# Patient Record
Sex: MALE
[De-identification: names, ages, dates, MRNs, and addresses within clinical notes are randomized per-mention and may not be internally consistent; named-entity substitution may affect disease eponyms.]

---

## 2017-12-12 ENCOUNTER — HOSPITAL ENCOUNTER (EMERGENCY)
Dept: HOSPITAL 14 - H.ER | Age: 32
Discharge: HOME | End: 2017-12-12
Payer: COMMERCIAL

## 2017-12-12 VITALS — SYSTOLIC BLOOD PRESSURE: 120 MMHG | DIASTOLIC BLOOD PRESSURE: 70 MMHG | OXYGEN SATURATION: 98 % | TEMPERATURE: 98 F

## 2017-12-12 VITALS — BODY MASS INDEX: 36 KG/M2

## 2017-12-12 VITALS — RESPIRATION RATE: 20 BRPM | HEART RATE: 99 BPM

## 2017-12-12 DIAGNOSIS — S05.02XA: Primary | ICD-10-CM
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
## 2017-12-12 NOTE — ED PDOC
HPI: Eye Injury/Pain


Time Seen by Provider: 12/12/17 10:04


Chief Complaint (Nursing): Eye Problem


Chief Complaint (Provider): Eye Pain


History Per: Patient


History/Exam Limitations: no limitations


Onset/Duration Of Symptoms: Days (today)


Current Symptoms Are (Timing): Still Present


Additional Complaint(s): 





Pt. with pain to the left eye this morning.  Pt. denies any injury.  No vision 

changes.  No headaches, numbness, tingles, weakness.  No neck pain.  Had a 

corneal abrasion last time and feels the same.  No cough.  Able to move eyes 

with no issues.  





Past Medical History


Reviewed: Nursing Documentation, Vital Signs


Vital Signs: 


 Last Vital Signs











Temp  98.8 F   12/12/17 09:36


 


Pulse  99 H  12/12/17 09:36


 


Resp  20   12/12/17 09:36


 


BP  135/76   12/12/17 09:36


 


Pulse Ox  96   12/12/17 09:36














- Medical History


PMH: Seizures


Other PMH: corneal abrasion





- Family History


Family History: States: Unknown Family Hx





- Social History


Current smoker - smoking cessation education provided: No


Alcohol: None


Drugs: Denies





- Immunization History


Hx Tetanus Toxoid Vaccination: No


Hx Influenza Vaccination: No


Hx Pneumococcal Vaccination: No





- Home Medications


Home Medications: 


 Ambulatory Orders











 Medication  Instructions  Recorded


 


Amoxicillin 500 mg PO Q12 #20 cap 08/17/15


 


Levetiracetam [Keppra] 750 mg PO BID 08/17/15


 


Tobramycin [Tobrex] 5 ml TOP QID #1 bottle 10/05/16














- Allergies


Allergies/Adverse Reactions: 


 Allergies











Allergy/AdvReac Type Severity Reaction Status Date / Time


 


FISH Allergy  RASH Verified 12/12/17 09:51


 


shellfish derived Allergy  RASH Verified 12/12/17 09:51














Review of Systems


Constitutional: Negative for: Fever, Weakness


Eyes: Positive for: Pain.  Negative for: Vision Change, Conjunctivae 

Inflammation, Eyelid Inflammation


ENT: Negative for: Ear Pain, Ear Discharge, Nose Pain, Nose Congestion, Mouth 

Pain


Cardiovascular: Negative for: Chest Pain


Respiratory: Negative for: Cough, Shortness of Breath


Gastrointestinal: Negative for: Nausea, Vomiting, Abdominal Pain


Musculoskeletal: Negative for: Neck Pain


Skin: Negative for: Rash


Neurological: Negative for: Weakness





Physical Exam





- Reviewed


Nursing Documentation Reviewed: Yes


Vital Signs Reviewed: Yes





- Physical Exam


Appears: Positive for: Non-toxic, No Acute Distress


Head Exam: Positive for: ATRAUMATIC, NORMAL INSPECTION, NORMOCEPHALIC


Eye Exam: Positive for: EOMI, PERRL, Other (L eye with no foreign body under 

eyelids; L cornea with patch of fluorescein uptake at 6 o' clock; pain pain 

gone with numbing agent).  Negative for: Nystagmus, Periorbital swelling, 

Periorbital tenderness


ENT: Positive for: Normal ENT Inspection.  Negative for: Nasal Congestion, 

Pharyngeal Erythema, Tonsillar Exudate


Neck: Positive for: Normal, Supple


Cardiovascular/Chest: Positive for: Regular Rate, Rhythm


Respiratory: Positive for: Normal Breath Sounds


Neurologic/Psych: Positive for: Alert, Oriented.  Negative for: Motor/Sensory 

Deficits





- ECG


O2 Sat by Pulse Oximetry: 96


Pulse Ox Interpretation: Normal





- Progress


ED Course And Treament: 





1023:  Stable.  AAOx3.  Pain free.  Spoke with Dr. Garcia.  Will see pt. in 

office now.  He will rx prescriptions as needed after evaluating pt.  





Disposition





- Clinical Impression


Clinical Impression: 


 Corneal abrasion








- Patient ED Disposition


Is Patient to be Admitted: No


Counseled Patient/Family Regarding: Diagnosis, Need For Followup





- Disposition


Referrals: 


[facility] [Outside] - 12/13/17


Prakash Garcia MD [Staff Provider] - 


Disposition: Routine/Home


Disposition Time: 10:25


Condition: STABLE


Additional Instructions: 


Go to Dr. Garcia's office without fail straight from here.  You have to get 

further evaluation of your eye and he will give you prescriptions as needed.  


Return if not better in 3 days. 


Instructions:  Corneal Abrasion  (ED)

## 2018-12-18 ENCOUNTER — HOSPITAL ENCOUNTER (EMERGENCY)
Dept: HOSPITAL 31 - C.ER | Age: 33
LOS: 1 days | Discharge: HOME | End: 2018-12-19
Payer: MEDICAID

## 2018-12-18 VITALS — BODY MASS INDEX: 36 KG/M2

## 2018-12-18 DIAGNOSIS — Z76.0: Primary | ICD-10-CM

## 2018-12-18 DIAGNOSIS — G40.909: ICD-10-CM

## 2018-12-19 VITALS
HEART RATE: 107 BPM | TEMPERATURE: 97.8 F | RESPIRATION RATE: 20 BRPM | SYSTOLIC BLOOD PRESSURE: 124 MMHG | OXYGEN SATURATION: 95 % | DIASTOLIC BLOOD PRESSURE: 84 MMHG

## 2018-12-19 NOTE — C.PDOC
History Of Present Illness


33 year old male presents to the ER requesting a dose of his keppra. Patient 

states he took his dose this morning but could not find it this evening. Denies 

any complaints at this time.


Time Seen by Provider: 12/18/18 23:57


Chief Complaint (Nursing): Med Refill


History Per: Patient


History/Exam Limitations: no limitations


Onset/Duration Of Symptoms: Hrs


Current Symptoms Are (Timing): Still Present


Recent travel outside of the [place]: No





Past Medical History


Reviewed: Historical Data, Nursing Documentation, Vital Signs


Vital Signs: 





                                Last Vital Signs











Temp  97.8 F   12/18/18 23:54


 


Pulse  107 H  12/18/18 23:54


 


Resp  20   12/18/18 23:54


 


BP  124/84   12/18/18 23:54


 


Pulse Ox  95   12/18/18 23:54














- Medical History


PMH: Seizures


Family History: States: Unknown Family Hx





- Social History


Hx Alcohol Use: Yes


Hx Substance Use: No





- Immunization History


Hx Tetanus Toxoid Vaccination: Yes


Hx Influenza Vaccination: No


Hx Pneumococcal Vaccination: No





Review Of Systems


Constitutional: Negative for: Fever, Chills


Cardiovascular: Negative for: Chest Pain, Palpitations


Respiratory: Negative for: Cough, Shortness of Breath


Gastrointestinal: Negative for: Nausea, Vomiting


Neurological: Negative for: Weakness, Numbness





Physical Exam





- Physical Exam


Appears: Non-toxic


Skin: Normal Color, Warm, Dry


Head: Atraumatic, Normacephalic


Oral Mucosa: Moist


Chest: Symmetrical, No Tenderness


Cardiovascular: Rhythm Regular


Respiratory: Normal Breath Sounds, No Rales, No Rhonchi, No Wheezing


Gastrointestinal/Abdominal: Soft, No Tenderness


Neurological/Psych: Oriented x3, Normal Speech





ED Course And Treatment


O2 Sat by Pulse Oximetry: 95 (Room air)


Pulse Ox Interpretation: Normal





Medical Decision Making


Medical Decision Making: 


Keppra administered. Patient is resting comfortably in the ER in no acute 

distress, vitals are stable, will discharge home with instructions to follow up 

with PMD.





Disposition


Counseled Patient/Family Regarding: Diagnosis, Need For Followup





- Disposition


Referrals: 


Lisa Gupta MD [Staff Provider] - 


Disposition: HOME/ ROUTINE


Disposition Time: 00:07


Condition: GOOD


Forms:  [facility] (English), General Discharge Instructions





- POA


Present On Arrival: None





- Clinical Impression


Clinical Impression: 


 Medicine refill








- PA / NP / Resident Statement


MD/ has reviewed & agrees with the documentation as recorded.





- Scribe Statement


The provider has reviewed the documentation as recorded by the Scribe





Dale Merritt





All medical record entries made by the Humberto were at my direction and 

personally dictated by me. I have reviewed the chart and agree that the record 

accurately reflects my personal performance of the history, physical exam, 

medical decision making, and the department course for this patient. I have also

personally directed, reviewed, and agree with the discharge instructions and 

disposition.